# Patient Record
Sex: FEMALE | Race: BLACK OR AFRICAN AMERICAN | NOT HISPANIC OR LATINO | Employment: FULL TIME | URBAN - METROPOLITAN AREA
[De-identification: names, ages, dates, MRNs, and addresses within clinical notes are randomized per-mention and may not be internally consistent; named-entity substitution may affect disease eponyms.]

---

## 2017-08-03 ENCOUNTER — LAB VISIT (OUTPATIENT)
Dept: LAB | Facility: HOSPITAL | Age: 22
End: 2017-08-03
Attending: NURSE PRACTITIONER
Payer: OTHER GOVERNMENT

## 2017-08-03 ENCOUNTER — OFFICE VISIT (OUTPATIENT)
Dept: INTERNAL MEDICINE | Facility: CLINIC | Age: 22
End: 2017-08-03
Payer: OTHER GOVERNMENT

## 2017-08-03 VITALS
HEART RATE: 93 BPM | TEMPERATURE: 96 F | BODY MASS INDEX: 30.83 KG/M2 | DIASTOLIC BLOOD PRESSURE: 60 MMHG | SYSTOLIC BLOOD PRESSURE: 114 MMHG | HEIGHT: 67 IN | WEIGHT: 196.44 LBS

## 2017-08-03 DIAGNOSIS — Z00.00 ANNUAL PHYSICAL EXAM: Primary | ICD-10-CM

## 2017-08-03 DIAGNOSIS — Z00.00 ANNUAL PHYSICAL EXAM: ICD-10-CM

## 2017-08-03 LAB
ALBUMIN SERPL BCP-MCNC: 4 G/DL
ALP SERPL-CCNC: 52 U/L
ALT SERPL W/O P-5'-P-CCNC: 16 U/L
ANION GAP SERPL CALC-SCNC: 11 MMOL/L
AST SERPL-CCNC: 26 U/L
BASOPHILS # BLD AUTO: 0.02 K/UL
BASOPHILS NFR BLD: 0.4 %
BILIRUB SERPL-MCNC: 0.4 MG/DL
BUN SERPL-MCNC: 19 MG/DL
CALCIUM SERPL-MCNC: 9 MG/DL
CHLORIDE SERPL-SCNC: 104 MMOL/L
CHOLEST/HDLC SERPL: 1.6 {RATIO}
CO2 SERPL-SCNC: 22 MMOL/L
CREAT SERPL-MCNC: 0.9 MG/DL
DIFFERENTIAL METHOD: NORMAL
EOSINOPHIL # BLD AUTO: 0.1 K/UL
EOSINOPHIL NFR BLD: 1.3 %
ERYTHROCYTE [DISTWIDTH] IN BLOOD BY AUTOMATED COUNT: 14 %
EST. GFR  (AFRICAN AMERICAN): >60 ML/MIN/1.73 M^2
EST. GFR  (NON AFRICAN AMERICAN): >60 ML/MIN/1.73 M^2
GLUCOSE SERPL-MCNC: 82 MG/DL
HCT VFR BLD AUTO: 38.3 %
HDL/CHOLESTEROL RATIO: 60.7 %
HDLC SERPL-MCNC: 140 MG/DL
HDLC SERPL-MCNC: 85 MG/DL
HGB BLD-MCNC: 12.8 G/DL
LDLC SERPL CALC-MCNC: 43.8 MG/DL
LYMPHOCYTES # BLD AUTO: 1.6 K/UL
LYMPHOCYTES NFR BLD: 30.3 %
MCH RBC QN AUTO: 30 PG
MCHC RBC AUTO-ENTMCNC: 33.4 G/DL
MCV RBC AUTO: 90 FL
MONOCYTES # BLD AUTO: 0.5 K/UL
MONOCYTES NFR BLD: 10 %
NEUTROPHILS # BLD AUTO: 3 K/UL
NEUTROPHILS NFR BLD: 57.8 %
NONHDLC SERPL-MCNC: 55 MG/DL
PLATELET # BLD AUTO: 250 K/UL
PMV BLD AUTO: 10.4 FL
POTASSIUM SERPL-SCNC: 4.2 MMOL/L
PROT SERPL-MCNC: 7.3 G/DL
RBC # BLD AUTO: 4.26 M/UL
SODIUM SERPL-SCNC: 137 MMOL/L
TRIGL SERPL-MCNC: 56 MG/DL
WBC # BLD AUTO: 5.22 K/UL

## 2017-08-03 PROCEDURE — 36415 COLL VENOUS BLD VENIPUNCTURE: CPT | Mod: PO

## 2017-08-03 PROCEDURE — 99999 PR PBB SHADOW E&M-NEW PATIENT-LVL IV: CPT | Mod: PBBFAC,,, | Performed by: NURSE PRACTITIONER

## 2017-08-03 PROCEDURE — 80061 LIPID PANEL: CPT

## 2017-08-03 PROCEDURE — 85025 COMPLETE CBC W/AUTO DIFF WBC: CPT

## 2017-08-03 PROCEDURE — 99204 OFFICE O/P NEW MOD 45 MIN: CPT | Mod: PBBFAC,PO | Performed by: NURSE PRACTITIONER

## 2017-08-03 PROCEDURE — 99385 PREV VISIT NEW AGE 18-39: CPT | Mod: S$PBB,,, | Performed by: NURSE PRACTITIONER

## 2017-08-03 PROCEDURE — 80053 COMPREHEN METABOLIC PANEL: CPT

## 2017-08-03 RX ORDER — NORGESTIMATE AND ETHINYL ESTRADIOL 0.25-0.035
1 KIT ORAL DAILY
Qty: 30 TABLET | Refills: 11 | Status: SHIPPED | OUTPATIENT
Start: 2017-08-03 | End: 2018-08-03

## 2017-08-03 NOTE — PROGRESS NOTES
Subjective:       Patient ID: Amy Ledesma is a 21 y.o. female.    Chief Complaint: Annual Exam    No acute symptoms today    Transform effects- diet program- 6 days a week exercise. 3 days. Always been pretty active. Taking multivitamins, probiotic, omega 3. No GI issues. Daily BMs.   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 She is also in the [employer]   Is currently living in [place]      She does not have a GYN here - she was on BC oral- has not had for over a year-she had a pap 1 1/2 years ago- normal  Hx of chlamydia. No current symptoms.  She is interested in establishing care with GYN here  Would like to restart oral BC. She does not smoke. No hx of blood clots. No hx of liver disease. No migraines or severe headache    She is up to date with eye exams- last was 2017- wears glasses   Dentist - last week- goes twice a year for cleanings; wears braces - is getting them off in 2 weeks     She does drink ETOH socially, no illicit drug use.    She is up to date with all of her immunizations.            Review of Systems   Constitutional: Negative for activity change, appetite change, chills, diaphoresis, fatigue, fever and unexpected weight change.   HENT: Negative for congestion, ear pain, postnasal drip, rhinorrhea, sinus pressure, sneezing and sore throat.    Eyes: Negative for photophobia, pain and visual disturbance.   Respiratory: Negative for cough, chest tightness and shortness of breath.    Cardiovascular: Negative for chest pain, palpitations and leg swelling.   Gastrointestinal: Negative for abdominal pain, constipation, diarrhea, nausea and vomiting.   Genitourinary: Negative for dysuria and frequency.   Musculoskeletal: Negative for arthralgias.   Neurological:  Negative for dizziness, light-headedness and headaches.   Psychiatric/Behavioral: Negative for sleep disturbance.       Objective:      Physical Exam   Constitutional: She is oriented to person, place, and time. She appears well-developed and well-nourished.   HENT:   Head: Normocephalic and atraumatic.   Right Ear: Tympanic membrane normal.   Left Ear: Tympanic membrane normal.   Eyes: Conjunctivae and EOM are normal. Pupils are equal, round, and reactive to light.   Neck: Normal range of motion. Neck supple.   Cardiovascular: Normal rate, regular rhythm, normal heart sounds and intact distal pulses.    Pulmonary/Chest: Effort normal and breath sounds normal.   Abdominal: Soft. Bowel sounds are normal.   Musculoskeletal: Normal range of motion.   Neurological: She is alert and oriented to person, place, and time.   Skin: Skin is warm and dry.   Psychiatric: She has a normal mood and affect.       Assessment:       1. Annual physical exam        Plan:   Annual physical exam  -     CBC auto differential; Future; Expected date: 08/03/2017  -     Comprehensive metabolic panel; Future; Expected date: 08/03/2017  -     Lipid panel; Future; Expected date: 12/01/2017  -     norgestimate-ethinyl estradiol (ORTHO-CYCLEN) 0.25-35 mg-mcg per tablet; Take 1 tablet by mouth once daily.  Dispense: 30 tablet; Refill: 11  -     Ambulatory referral to Obstetrics / Gynecology      -labs  -BC sent to pharmacy- SE discussed.   -est care with GYN and Dr. Cagle

## 2017-08-07 ENCOUNTER — OFFICE VISIT (OUTPATIENT)
Dept: OBSTETRICS AND GYNECOLOGY | Facility: CLINIC | Age: 22
End: 2017-08-07
Payer: OTHER GOVERNMENT

## 2017-08-07 VITALS
WEIGHT: 195.31 LBS | HEIGHT: 67 IN | DIASTOLIC BLOOD PRESSURE: 82 MMHG | SYSTOLIC BLOOD PRESSURE: 128 MMHG | BODY MASS INDEX: 30.65 KG/M2

## 2017-08-07 DIAGNOSIS — Z30.41 ENCOUNTER FOR SURVEILLANCE OF CONTRACEPTIVE PILLS: ICD-10-CM

## 2017-08-07 DIAGNOSIS — Z01.419 ENCOUNTER FOR GYNECOLOGICAL EXAMINATION WITHOUT ABNORMAL FINDING: Primary | ICD-10-CM

## 2017-08-07 PROCEDURE — 88175 CYTOPATH C/V AUTO FLUID REDO: CPT | Performed by: PATHOLOGY

## 2017-08-07 PROCEDURE — 99213 OFFICE O/P EST LOW 20 MIN: CPT | Mod: PBBFAC,PO | Performed by: NURSE PRACTITIONER

## 2017-08-07 PROCEDURE — 99385 PREV VISIT NEW AGE 18-39: CPT | Mod: S$PBB,,, | Performed by: NURSE PRACTITIONER

## 2017-08-07 PROCEDURE — 99999 PR PBB SHADOW E&M-EST. PATIENT-LVL III: CPT | Mod: PBBFAC,,, | Performed by: NURSE PRACTITIONER

## 2017-08-07 PROCEDURE — 88141 CYTOPATH C/V INTERPRET: CPT | Mod: ,,, | Performed by: PATHOLOGY

## 2017-08-07 NOTE — LETTER
August 7, 2017      Keshia Azar NP  [address] [place] [address]           [facility] - OB/ GYN  [address] [place] [address]  Phone: [phone]  Fax: [phone]          Patient: Amy Ledesma   MR Number: [id number]   YOB: 1995   Date of Visit: 8/7/2017       Dear Keshia Azar:    Thank you for referring Amy Ledesma to me for evaluation. Attached you will find relevant portions of my assessment and plan of care.    If you have questions, please do not hesitate to call me. I look forward to following Amy Ledesma along with you.    Sincerely,    Rosario Blevins, JEAN-IPERRE    Enclosure  CC:  No Recipients    If you would like to receive this communication electronically, please contact [email or web address] or [phone] to request more information on [facility] access.    For providers and/or their staff who would like to refer a patient to [facility], please contact us through our one-stop-shop provider referral line, [facility], at [phone].    If you feel you have received this communication in error or would no longer like to receive these types of communications, please e-mail [email or web address]

## 2017-08-07 NOTE — PROGRESS NOTES
"CC: Well woman exam    Amy Ledesma is a 22 y.o. female No obstetric history on file. presents for well woman exam.  LMP: Patient's last menstrual period was 07/30/2017 (exact date)..  No issues, problems, or complaints.    She received her ocp from pcp has not started yet - discussed how to start.     Past Medical History:   Diagnosis Date    Chlamydia 2014     History reviewed. No pertinent surgical history.  Social History     Social History    Marital status: Single     Spouse name: N/A    Number of children: N/A    Years of education: N/A     Occupational History    Not on file.     Social History Main Topics    Smoking status: Former Smoker    Smokeless tobacco: Never Used    Alcohol use Not on file    Drug use: Unknown    Sexual activity: Not on file     Other Topics Concern    Not on file     Social History Narrative    No narrative on file     Family History   Problem Relation Age of Onset    Hypertension Mother     No Known Problems Father     Breast cancer Maternal Grandmother     Hypertension Maternal Grandmother      OB History     No data available          /82 (BP Location: Left arm, Patient Position: Sitting)   Ht 5' 6.5" (1.689 m)   Wt 88.6 kg (195 lb 5.2 oz)   LMP 07/30/2017 (Exact Date)   BMI 31.05 kg/m²       ROS:  GENERAL: Denies weight gain or weight loss. Feeling well overall.   SKIN: Denies rash or lesions.   HEAD: Denies head injury or headache.   NODES: Denies enlarged lymph nodes.   CHEST: Denies chest pain or shortness of breath.   CARDIOVASCULAR: Denies palpitations or left sided chest pain.   ABDOMEN: No abdominal pain, constipation, diarrhea, nausea, vomiting or rectal bleeding.   URINARY: No frequency, dysuria, hematuria, or burning on urination.  REPRODUCTIVE: See HPI.   BREASTS: The patient performs breast self-examination and denies pain, lumps, or nipple discharge.   HEMATOLOGIC: No easy bruisability or excessive bleeding.   MUSCULOSKELETAL: Denies " joint pain or swelling.   NEUROLOGIC: Denies syncope or weakness.   PSYCHIATRIC: Denies depression, anxiety or mood swings.    PHYSICAL EXAM:  APPEARANCE: Well nourished, well developed, in no acute distress.  AFFECT: WNL, alert and oriented x 3  SKIN: No acne or hirsutism  NECK: Neck symmetric without masses or thyromegaly  NODES: No inguinal, cervical, axillary, or femoral lymph node enlargement  CHEST: Good respiratory effect  ABDOMEN: Soft.  No tenderness or masses.  No hepatosplenomegaly.  No hernias.  BREASTS: Symmetrical, no skin changes or visible lesions.  No palpable masses, nipple discharge bilaterally.  PELVIC: Normal external genitalia without lesions.  Normal hair distribution.  Adequate perineal body, normal urethral meatus.  Vagina moist and well rugated without lesions or discharge.  Cervix pink, without lesions, discharge or tenderness.  No significant cystocele or rectocele.  Bimanual exam shows uterus to be normal size, regular, mobile and nontender.  Adnexa without masses or tenderness.    EXTREMITIES: No edema.  Physical Exam    1. Encounter for gynecological examination without abnormal finding  Liquid-based pap smear, screening   2. Encounter for surveillance of contraceptive pills      AND PLAN:    Patient was counseled today on A.C.S. Pap guidelines and recommendations for yearly pelvic exams, mammograms and monthly self breast exams; to see her PCP for other health maintenance.

## 2017-08-14 ENCOUNTER — TELEPHONE (OUTPATIENT)
Dept: OBSTETRICS AND GYNECOLOGY | Facility: CLINIC | Age: 22
End: 2017-08-14

## 2017-08-15 ENCOUNTER — TELEPHONE (OUTPATIENT)
Dept: OBSTETRICS AND GYNECOLOGY | Facility: CLINIC | Age: 22
End: 2017-08-15

## 2017-09-13 PROBLEM — R87.612 LGSIL ON PAP SMEAR OF CERVIX: Status: ACTIVE | Noted: 2017-09-13

## 2017-11-16 DIAGNOSIS — Z00.00 ANNUAL PHYSICAL EXAM: ICD-10-CM

## 2017-11-16 RX ORDER — NORGESTIMATE AND ETHINYL ESTRADIOL 0.25-0.035
1 KIT ORAL DAILY
Qty: 30 TABLET | Refills: 11 | OUTPATIENT
Start: 2017-11-16 | End: 2018-11-16

## 2018-07-06 ENCOUNTER — TELEPHONE (OUTPATIENT)
Dept: OBSTETRICS AND GYNECOLOGY | Facility: CLINIC | Age: 23
End: 2018-07-06

## 2018-07-06 NOTE — TELEPHONE ENCOUNTER
----- Message from Estelle Johnson sent at 7/6/2018 12:19 PM CDT -----  Pt at [phone]//states she lives in [place] now and is needing her birth control med refilled//she transferred the script to [place] but they cannot refill because it was signed by a NP//is needing a Dr signature//she uses//[facility] on [address] in [place]//phone number is [phone]//please call to discuss//alex/eva

## 2018-07-06 NOTE — TELEPHONE ENCOUNTER
Spoke with pt and informed her that Ms Bolivar is currently out of clinic but will return Monday. Also informed pt that she is due for an annual in august and since she is no longer under our care she will need to seek attention from a GYN provider in [place] for further refills. Pt verbalized understanding.